# Patient Record
Sex: FEMALE
[De-identification: names, ages, dates, MRNs, and addresses within clinical notes are randomized per-mention and may not be internally consistent; named-entity substitution may affect disease eponyms.]

---

## 2019-03-19 ENCOUNTER — TRANSCRIPTION ENCOUNTER (OUTPATIENT)
Age: 59
End: 2019-03-19

## 2022-05-04 ENCOUNTER — APPOINTMENT (OUTPATIENT)
Dept: ORTHOPEDIC SURGERY | Facility: CLINIC | Age: 62
End: 2022-05-04
Payer: COMMERCIAL

## 2022-05-04 VITALS — WEIGHT: 112 LBS | BODY MASS INDEX: 20.61 KG/M2 | HEIGHT: 62 IN

## 2022-05-04 DIAGNOSIS — M25.572 PAIN IN LEFT ANKLE AND JOINTS OF LEFT FOOT: ICD-10-CM

## 2022-05-04 PROBLEM — Z00.00 ENCOUNTER FOR PREVENTIVE HEALTH EXAMINATION: Status: ACTIVE | Noted: 2022-05-04

## 2022-05-04 PROCEDURE — 73610 X-RAY EXAM OF ANKLE: CPT | Mod: LT

## 2022-05-04 PROCEDURE — 99203 OFFICE O/P NEW LOW 30 MIN: CPT

## 2022-05-04 NOTE — DISCUSSION/SUMMARY
[de-identified] : This patient has a left ankle sprain with avulsion fracture to the fibula. She will ice and elevate what she can to keep it moving. Healing time discussed. Use of medication discussed. Physical activity discussed. She doesn't need a brace. She does need a boot followup will be as needed.

## 2022-05-04 NOTE — PHYSICAL EXAM
[de-identified] : Left ankle shows lateral swelling and some tenderness over the tip of the fibula range of motion no instability no pain medially no pain over the syndesmosis neurovascular exam is normal. [de-identified] : Left ankle x-ray shows what appears to be an avulsion fracture to the distal fibula.

## 2022-05-04 NOTE — REASON FOR VISIT
[Initial Visit] : an initial visit for [Ankle Pain] : ankle pain [FreeTextEntry2] : left ankle injury 05/03/22. Swelling and stiffness. Used ice and elevation position

## 2022-05-04 NOTE — HISTORY OF PRESENT ILLNESS
[FreeTextEntry1] : This patient a 62-year-old female twisted her left ankle yesterday morning. She developed swelling as the day went along and increasing pain. She's been icing and elevating it she took some medication. On today's visit there is good she doesn't remember having a previous injury. No numbness no tingling